# Patient Record
Sex: MALE | Race: OTHER | HISPANIC OR LATINO | URBAN - METROPOLITAN AREA
[De-identification: names, ages, dates, MRNs, and addresses within clinical notes are randomized per-mention and may not be internally consistent; named-entity substitution may affect disease eponyms.]

---

## 2020-06-05 ENCOUNTER — TELEPHONE ENCOUNTER (OUTPATIENT)
Dept: URBAN - METROPOLITAN AREA CLINIC 40 | Facility: CLINIC | Age: 60
End: 2020-06-05

## 2020-06-11 ENCOUNTER — OFFICE VISIT (OUTPATIENT)
Dept: URBAN - METROPOLITAN AREA CLINIC 40 | Facility: CLINIC | Age: 60
End: 2020-06-11

## 2020-06-19 ENCOUNTER — OFFICE VISIT (OUTPATIENT)
Dept: URBAN - METROPOLITAN AREA TELEHEALTH 2 | Facility: TELEHEALTH | Age: 60
End: 2020-06-19

## 2023-05-10 ENCOUNTER — TELEPHONE ENCOUNTER (OUTPATIENT)
Dept: URBAN - METROPOLITAN AREA CLINIC 23 | Facility: CLINIC | Age: 63
End: 2023-05-10

## 2025-03-13 ENCOUNTER — LAB OUTSIDE AN ENCOUNTER (OUTPATIENT)
Dept: URBAN - METROPOLITAN AREA CLINIC 40 | Facility: CLINIC | Age: 65
End: 2025-03-13

## 2025-03-13 ENCOUNTER — DASHBOARD ENCOUNTERS (OUTPATIENT)
Age: 65
End: 2025-03-13

## 2025-03-13 ENCOUNTER — OFFICE VISIT (OUTPATIENT)
Dept: URBAN - METROPOLITAN AREA CLINIC 40 | Facility: CLINIC | Age: 65
End: 2025-03-13
Payer: MEDICARE

## 2025-03-13 VITALS
DIASTOLIC BLOOD PRESSURE: 80 MMHG | HEART RATE: 70 BPM | BODY MASS INDEX: 31.49 KG/M2 | WEIGHT: 189 LBS | SYSTOLIC BLOOD PRESSURE: 126 MMHG | TEMPERATURE: 98 F | HEIGHT: 65 IN

## 2025-03-13 DIAGNOSIS — Z86.0102 HISTORY OF HYPERPLASTIC POLYP OF COLON: ICD-10-CM

## 2025-03-13 DIAGNOSIS — K31.A0 GASTRIC INTESTINAL METAPLASIA: ICD-10-CM

## 2025-03-13 DIAGNOSIS — K21.00 GASTROESOPHAGEAL REFLUX DISEASE WITH ESOPHAGITIS WITHOUT HEMORRHAGE: ICD-10-CM

## 2025-03-13 PROBLEM — 266433003: Status: ACTIVE | Noted: 2025-03-13

## 2025-03-13 PROCEDURE — 99204 OFFICE O/P NEW MOD 45 MIN: CPT

## 2025-03-13 RX ORDER — NITROGLYCERIN 0.4 MG/1
DISSOLVE 1 TAB UNDER TONGUE FOR CHEST PAIN - IF PAIN REMAINS AFTER 5 MIN, CALL 911 AND REPEAT DOSE. MAX 3 TABS IN 15 MINUTES TABLET SUBLINGUAL
Qty: 25 | Refills: 1 | Status: ACTIVE | COMMUNITY
Start: 2020-05-16

## 2025-03-13 NOTE — HPI-TODAY'S VISIT:
The patient presents for surveillance colonoscopy. He has hx of colon polyps, esophagitis, and gastric focal intestinal metaplasia. Not currently on medication for GERD.   He reports esophageal spasms a couple of times a year of spasms, does have odynophagia more frequently with eating. He reports acid reflux and heartburn frequently. No abdominal pain, rectal bleeding, weight loss. He reports normal daily BMs.   Did see cardiology over a year ago for HLD/CAD. CTA with mild amount of plaque build up, thoracic artery dilation.   Recent procedures Colonoscopy 2020, Dr. Major: Fair colon prep. Perianal skin tags. Two 4 to 5mm polyps removed from the rectum, bx reports hyperplastic polyps. Diverticulosis in sigmoid, descending, and transverse colon. Non-bleeding, internal hemorrhoids.   EGD 2019, Dr. Major: LA grade A reflux esophagitis, bx reports squamous mucosa with no abnormality. Non-bleeding gastric ulcers with no stigmata of bleeding. Chronic gastritis. Gastric biopsies reports chronic gastritis with focal intestinal metaplasia and reparative changes. Chronic duodenitis.

## 2025-04-21 ENCOUNTER — TELEPHONE ENCOUNTER (OUTPATIENT)
Dept: URBAN - METROPOLITAN AREA CLINIC 40 | Facility: CLINIC | Age: 65
End: 2025-04-21

## 2025-04-30 ENCOUNTER — OFFICE VISIT (OUTPATIENT)
Dept: URBAN - METROPOLITAN AREA SURGERY CENTER 30 | Facility: SURGERY CENTER | Age: 65
End: 2025-04-30

## 2025-05-30 ENCOUNTER — LAB OUTSIDE AN ENCOUNTER (OUTPATIENT)
Dept: URBAN - METROPOLITAN AREA CLINIC 40 | Facility: CLINIC | Age: 65
End: 2025-05-30

## 2025-05-30 ENCOUNTER — OFFICE VISIT (OUTPATIENT)
Dept: URBAN - METROPOLITAN AREA CLINIC 40 | Facility: CLINIC | Age: 65
End: 2025-05-30
Payer: MEDICARE

## 2025-05-30 DIAGNOSIS — K59.09 CHRONIC CONSTIPATION: ICD-10-CM

## 2025-05-30 DIAGNOSIS — K31.A0 GASTRIC INTESTINAL METAPLASIA: ICD-10-CM

## 2025-05-30 DIAGNOSIS — R10.32 LEFT LOWER QUADRANT ABDOMINAL PAIN: ICD-10-CM

## 2025-05-30 DIAGNOSIS — K29.60 EROSIVE GASTRITIS: ICD-10-CM

## 2025-05-30 DIAGNOSIS — Z86.0100 PERSONAL HISTORY OF COLON POLYPS, UNSPECIFIED: ICD-10-CM

## 2025-05-30 DIAGNOSIS — K21.00 GASTROESOPHAGEAL REFLUX DISEASE WITH ESOPHAGITIS WITHOUT HEMORRHAGE: ICD-10-CM

## 2025-05-30 PROBLEM — 1086791000119100: Status: ACTIVE | Noted: 2025-05-30

## 2025-05-30 PROCEDURE — 99213 OFFICE O/P EST LOW 20 MIN: CPT

## 2025-05-30 RX ORDER — LINACLOTIDE 145 UG/1
1 CAPSULE AT LEAST 30 MINUTES BEFORE THE FIRST MEAL OF THE DAY ON AN EMPTY STOMACH CAPSULE, GELATIN COATED ORAL ONCE A DAY
Qty: 30 | Refills: 1 | OUTPATIENT
Start: 2025-05-30 | End: 2025-07-29

## 2025-05-30 RX ORDER — NITROGLYCERIN 0.4 MG/1
DISSOLVE 1 TAB UNDER TONGUE FOR CHEST PAIN - IF PAIN REMAINS AFTER 5 MIN, CALL 911 AND REPEAT DOSE. MAX 3 TABS IN 15 MINUTES TABLET SUBLINGUAL
Qty: 25 | Refills: 1 | Status: ACTIVE | COMMUNITY
Start: 2020-05-16

## 2025-05-30 RX ORDER — SUCRALFATE 1 G/1
1 TABLET ON AN EMPTY STOMACH TABLET ORAL TWICE A DAY
Qty: 60 | Refills: 3 | OUTPATIENT
Start: 2025-05-30

## 2025-05-30 NOTE — HPI-TODAY'S VISIT:
The patient presents for follow up. He has hx of colon polyps, esophagitis, and gastric focal intestinal metaplasia.   He reports hx of constipation, previously was controlled but now has returned. Will take stool softeners with Bm every 3 days. He does report hx of external and internal hemorrhoids. No anal pain, rectal itching, bleeding, but he does note some stool seepage. Not liquid stool. He is also reporting 1 month hx of left sided abdominal pain.   Did see cardiology over a year ago for HLD/CAD. CTA with mild amount of plaque build up, thoracic artery dilation.   Recent procedures EGD 04/2025, Dr. Major: Normal esophagus. Chronic gastritis with edema, erythema, and erosions. Pathology reports chronic inactive gastritis with focal intestinal metaplasia and changes suggestive of treated H pylori. Normal examined duodenum. 3 year surveillance.   Colonoscopy 04/2025, Dr. Major: Poor bowel prep. Diverticulosis in sigmoid and descending colon. One small polyp removed from descending colon, bx reports tubular adenoma. Internal hemorrhoids, non-bleeding. 1 year surveillance due to poor bowel prep.  Colonoscopy 2020, Dr. Major: Fair colon prep. Perianal skin tags. Two 4 to 5mm polyps removed from the rectum, bx reports hyperplastic polyps. Diverticulosis in sigmoid, descending, and transverse colon. Non-bleeding, internal hemorrhoids.   EGD 2019, Dr. Major: LA grade A reflux esophagitis, bx reports squamous mucosa with no abnormality. Non-bleeding gastric ulcers with no stigmata of bleeding. Chronic gastritis. Gastric biopsies reports chronic gastritis with focal intestinal metaplasia and reparative changes. Chronic duodenitis.

## 2025-07-11 ENCOUNTER — LAB OUTSIDE AN ENCOUNTER (OUTPATIENT)
Dept: URBAN - METROPOLITAN AREA CLINIC 40 | Facility: CLINIC | Age: 65
End: 2025-07-11

## 2025-07-11 ENCOUNTER — OFFICE VISIT (OUTPATIENT)
Dept: URBAN - METROPOLITAN AREA CLINIC 40 | Facility: CLINIC | Age: 65
End: 2025-07-11
Payer: MEDICARE

## 2025-07-11 DIAGNOSIS — R14.0 ABDOMINAL BLOATING: ICD-10-CM

## 2025-07-11 DIAGNOSIS — R11.0 NAUSEA: ICD-10-CM

## 2025-07-11 DIAGNOSIS — Z86.0100 PERSONAL HISTORY OF COLON POLYPS, UNSPECIFIED: ICD-10-CM

## 2025-07-11 DIAGNOSIS — K21.00 GASTROESOPHAGEAL REFLUX DISEASE WITH ESOPHAGITIS WITHOUT HEMORRHAGE: ICD-10-CM

## 2025-07-11 DIAGNOSIS — K29.60 EROSIVE GASTRITIS: ICD-10-CM

## 2025-07-11 DIAGNOSIS — K31.A0 GASTRIC INTESTINAL METAPLASIA: ICD-10-CM

## 2025-07-11 DIAGNOSIS — K59.09 CHRONIC CONSTIPATION: ICD-10-CM

## 2025-07-11 PROCEDURE — 99213 OFFICE O/P EST LOW 20 MIN: CPT

## 2025-07-11 RX ORDER — SUCRALFATE 1 G/1
1 TABLET ON AN EMPTY STOMACH TABLET ORAL TWICE A DAY
Qty: 60 | Refills: 3 | Status: ACTIVE | COMMUNITY
Start: 2025-05-30

## 2025-07-11 RX ORDER — ONDANSETRON 4 MG/1
1 TABLET ON THE TONGUE AND ALLOW TO DISSOLVE TABLET, ORALLY DISINTEGRATING ORAL ONCE A DAY
Qty: 30 | OUTPATIENT
Start: 2025-07-11

## 2025-07-11 RX ORDER — LINACLOTIDE 145 UG/1
1 CAPSULE AT LEAST 30 MINUTES BEFORE THE FIRST MEAL OF THE DAY ON AN EMPTY STOMACH CAPSULE, GELATIN COATED ORAL ONCE A DAY
Qty: 30 | Refills: 1 | Status: ACTIVE | COMMUNITY
Start: 2025-05-30 | End: 2025-07-29

## 2025-07-11 RX ORDER — NITROGLYCERIN 0.4 MG/1
DISSOLVE 1 TAB UNDER TONGUE FOR CHEST PAIN - IF PAIN REMAINS AFTER 5 MIN, CALL 911 AND REPEAT DOSE. MAX 3 TABS IN 15 MINUTES TABLET SUBLINGUAL
Qty: 25 | Refills: 1 | Status: ACTIVE | COMMUNITY
Start: 2020-05-16

## 2025-07-11 RX ORDER — LINACLOTIDE 145 UG/1
1 CAPSULE AT LEAST 30 MINUTES BEFORE THE FIRST MEAL OF THE DAY ON AN EMPTY STOMACH CAPSULE, GELATIN COATED ORAL ONCE A DAY
Qty: 30 | Refills: 3 | OUTPATIENT
Start: 2025-07-08 | End: 2025-11-08

## 2025-07-11 NOTE — HPI-TODAY'S VISIT:
The patient presents for follow up. He has hx of colon polyps, esophagitis, and gastric focal intestinal metaplasia.   He reports constipation now well controlled with Linzess daily. May have one softer stool per week on the medication. His main concern today is increased upper abd bloating and nausea. He reports eating only small amounts of food, getting full fast.   Did see cardiology over a year ago for HLD/CAD. CTA with mild amount of plaque build up, thoracic artery dilation.   Recent procedures EGD 04/2025, Dr. Major: Normal esophagus. Chronic gastritis with edema, erythema, and erosions. Pathology reports chronic inactive gastritis with focal intestinal metaplasia and changes suggestive of treated H pylori. Normal examined duodenum. 3 year surveillance.   Colonoscopy 04/2025, Dr. Major: Poor bowel prep. Diverticulosis in sigmoid and descending colon. One small polyp removed from descending colon, bx reports tubular adenoma. Internal hemorrhoids, non-bleeding. 1 year surveillance due to poor bowel prep.  Colonoscopy 2020, Dr. Major: Fair colon prep. Perianal skin tags. Two 4 to 5mm polyps removed from the rectum, bx reports hyperplastic polyps. Diverticulosis in sigmoid, descending, and transverse colon. Non-bleeding, internal hemorrhoids.   EGD 2019, Dr. Major: LA grade A reflux esophagitis, bx reports squamous mucosa with no abnormality. Non-bleeding gastric ulcers with no stigmata of bleeding. Chronic gastritis. Gastric biopsies reports chronic gastritis with focal intestinal metaplasia and reparative changes. Chronic duodenitis.

## 2025-08-27 ENCOUNTER — OFFICE VISIT (OUTPATIENT)
Dept: URBAN - METROPOLITAN AREA CLINIC 39 | Facility: CLINIC | Age: 65
End: 2025-08-27